# Patient Record
Sex: FEMALE | Race: WHITE | NOT HISPANIC OR LATINO | Employment: OTHER | URBAN - METROPOLITAN AREA
[De-identification: names, ages, dates, MRNs, and addresses within clinical notes are randomized per-mention and may not be internally consistent; named-entity substitution may affect disease eponyms.]

---

## 2017-01-04 ENCOUNTER — LAB VISIT (OUTPATIENT)
Dept: LAB | Facility: HOSPITAL | Age: 42
End: 2017-01-04
Attending: FAMILY MEDICINE
Payer: OTHER GOVERNMENT

## 2017-01-04 DIAGNOSIS — Z00.00 ROUTINE CHECK-UP: ICD-10-CM

## 2017-01-04 DIAGNOSIS — Z13.220 SCREENING CHOLESTEROL LEVEL: ICD-10-CM

## 2017-01-04 LAB
25(OH)D3+25(OH)D2 SERPL-MCNC: 64 NG/ML
ALBUMIN SERPL BCP-MCNC: 3.9 G/DL
ALP SERPL-CCNC: 69 U/L
ALT SERPL W/O P-5'-P-CCNC: 13 U/L
ANION GAP SERPL CALC-SCNC: 9 MMOL/L
AST SERPL-CCNC: 16 U/L
BASOPHILS # BLD AUTO: 0.04 K/UL
BASOPHILS NFR BLD: 0.5 %
BILIRUB SERPL-MCNC: 0.6 MG/DL
BUN SERPL-MCNC: 14 MG/DL
CALCIUM SERPL-MCNC: 9.3 MG/DL
CHLORIDE SERPL-SCNC: 105 MMOL/L
CHOLEST/HDLC SERPL: 2.9 {RATIO}
CO2 SERPL-SCNC: 24 MMOL/L
CREAT SERPL-MCNC: 0.8 MG/DL
DIFFERENTIAL METHOD: NORMAL
EOSINOPHIL # BLD AUTO: 0.2 K/UL
EOSINOPHIL NFR BLD: 2.7 %
ERYTHROCYTE [DISTWIDTH] IN BLOOD BY AUTOMATED COUNT: 13.2 %
EST. GFR  (AFRICAN AMERICAN): >60 ML/MIN/1.73 M^2
EST. GFR  (NON AFRICAN AMERICAN): >60 ML/MIN/1.73 M^2
GLUCOSE SERPL-MCNC: 87 MG/DL
HCT VFR BLD AUTO: 42.2 %
HDL/CHOLESTEROL RATIO: 34.4 %
HDLC SERPL-MCNC: 180 MG/DL
HDLC SERPL-MCNC: 62 MG/DL
HGB BLD-MCNC: 14.4 G/DL
LDLC SERPL CALC-MCNC: 103 MG/DL
LYMPHOCYTES # BLD AUTO: 2.1 K/UL
LYMPHOCYTES NFR BLD: 24.1 %
MCH RBC QN AUTO: 29.2 PG
MCHC RBC AUTO-ENTMCNC: 34.1 %
MCV RBC AUTO: 86 FL
MONOCYTES # BLD AUTO: 0.6 K/UL
MONOCYTES NFR BLD: 6.3 %
NEUTROPHILS # BLD AUTO: 5.9 K/UL
NEUTROPHILS NFR BLD: 66.2 %
NONHDLC SERPL-MCNC: 118 MG/DL
PLATELET # BLD AUTO: 245 K/UL
PMV BLD AUTO: 10.7 FL
POTASSIUM SERPL-SCNC: 3.9 MMOL/L
PROT SERPL-MCNC: 7.9 G/DL
RBC # BLD AUTO: 4.93 M/UL
SODIUM SERPL-SCNC: 138 MMOL/L
TRIGL SERPL-MCNC: 75 MG/DL
TSH SERPL DL<=0.005 MIU/L-ACNC: 1.7 UIU/ML
WBC # BLD AUTO: 8.84 K/UL

## 2017-01-04 PROCEDURE — 82306 VITAMIN D 25 HYDROXY: CPT

## 2017-01-04 PROCEDURE — 84443 ASSAY THYROID STIM HORMONE: CPT

## 2017-01-04 PROCEDURE — 80053 COMPREHEN METABOLIC PANEL: CPT

## 2017-01-04 PROCEDURE — 36415 COLL VENOUS BLD VENIPUNCTURE: CPT | Mod: PO

## 2017-01-04 PROCEDURE — 80061 LIPID PANEL: CPT

## 2017-01-04 PROCEDURE — 85025 COMPLETE CBC W/AUTO DIFF WBC: CPT

## 2017-02-01 ENCOUNTER — DOCUMENTATION ONLY (OUTPATIENT)
Dept: FAMILY MEDICINE | Facility: CLINIC | Age: 42
End: 2017-02-01

## 2017-02-01 NOTE — PROGRESS NOTES
Pre-Visit Chart Review  For Appointment Scheduled on 2/6/17.    Health Maintenance Due   Topic Date Due    TETANUS VACCINE  12/11/1991    Pap Smear  12/11/1994    Mammogram  12/11/2013

## 2017-02-23 ENCOUNTER — APPOINTMENT (OUTPATIENT)
Dept: LAB | Facility: HOSPITAL | Age: 42
End: 2017-02-23
Attending: NURSE PRACTITIONER
Payer: OTHER GOVERNMENT

## 2017-02-23 ENCOUNTER — OFFICE VISIT (OUTPATIENT)
Dept: OBSTETRICS AND GYNECOLOGY | Facility: CLINIC | Age: 42
End: 2017-02-23
Payer: OTHER GOVERNMENT

## 2017-02-23 VITALS
WEIGHT: 144.63 LBS | DIASTOLIC BLOOD PRESSURE: 73 MMHG | HEIGHT: 67 IN | SYSTOLIC BLOOD PRESSURE: 111 MMHG | HEART RATE: 71 BPM | BODY MASS INDEX: 22.7 KG/M2

## 2017-02-23 DIAGNOSIS — Z01.419 ENCOUNTER FOR WELL WOMAN EXAM: Primary | ICD-10-CM

## 2017-02-23 PROCEDURE — 99213 OFFICE O/P EST LOW 20 MIN: CPT | Mod: PBBFAC,PO | Performed by: NURSE PRACTITIONER

## 2017-02-23 PROCEDURE — 87624 HPV HI-RISK TYP POOLED RSLT: CPT

## 2017-02-23 PROCEDURE — 99999 PR PBB SHADOW E&M-EST. PATIENT-LVL III: CPT | Mod: PBBFAC,,, | Performed by: NURSE PRACTITIONER

## 2017-02-23 PROCEDURE — 88175 CYTOPATH C/V AUTO FLUID REDO: CPT | Performed by: PATHOLOGY

## 2017-02-23 PROCEDURE — 99386 PREV VISIT NEW AGE 40-64: CPT | Mod: S$PBB,,, | Performed by: NURSE PRACTITIONER

## 2017-02-23 PROCEDURE — 88141 CYTOPATH C/V INTERPRET: CPT | Mod: ,,, | Performed by: PATHOLOGY

## 2017-02-23 NOTE — LETTER
February 23, 2017      Maggie Hay MD  [address]  [place] [address]           [facility] - OB/ GYN  [address] [place] [address] [address]  [facility] [address]  Phone: [phone]          Patient: Nancy Hanley   MR Number: [id number]   YOB: 1975   Date of Visit: 2/23/2017       Dear Dr. Maggie Hay:    Thank you for referring Nancy Hanley to me for evaluation. Attached you will find relevant portions of my assessment and plan of care.    If you have questions, please do not hesitate to call me. I look forward to following Nancy Hanley along with you.    Sincerely,    Ana Riojas, NP    Enclosure  CC:  No Recipients    If you would like to receive this communication electronically, please contact [email or web address] or [phone] to request more information on [facility] Link access.    For providers and/or their staff who would like to refer a patient to [facility], please contact us through our one-stop-shop provider referral line, [facility] , at [phone].    If you feel you have received this communication in error or would no longer like to receive these types of communications, please e-mail [email or web address]

## 2017-02-23 NOTE — PROGRESS NOTES
Chief Complaint   Patient presents with    Well Woman       History of Present Illness: Nancy Hanley is a 41 y.o. female that presents today 2017 for well gyn visit.  Pt here for annual exam. Pt denies ever having any abnormal pap smears in the past. Pt states that she had a pap smear 3 years ago with the [employer]. Her cycles are regular monthly with no associated complaints. She does not desire to be on birth control. Pt has her first mammogram scheduled in May. No other complaints or concerns noted.         Past Medical History   Diagnosis Date    Allergy     Asthma     Psoriasis        Past Surgical History   Procedure Laterality Date    Nasal polyp surgery  1990s    Breast surgery       augmentin       Family History   Problem Relation Age of Onset    Stroke Mother     Hypertension Mother     Deep vein thrombosis Mother     Asthma Maternal Uncle     Pancreatitis Maternal Uncle     Lung cancer Maternal Grandmother     Psoriasis Maternal Grandmother     Emphysema Maternal Grandfather        Social History     Social History    Marital status:      Spouse name: N/A    Number of children: N/A    Years of education: N/A     Occupational History    self-employeed      Social History Main Topics    Smoking status: Never Smoker    Smokeless tobacco: Never Used    Alcohol use Yes      Comment: occasionally    Drug use: No    Sexual activity: Yes     Partners: Male     Birth control/ protection: Partner-Vasectomy     Other Topics Concern    None     Social History Narrative       OB History    Para Term  AB SAB TAB Ectopic Multiple Living   3 0 0 0 0 0 0 0 0 3      # Outcome Date GA Lbr Trent/2nd Weight Sex Delivery Anes PTL Lv   3       Vag-Spont   Y   2       Vag-Spont   Y   1       Vag-Spont   Y          Current Outpatient Prescriptions   Medication Sig Dispense Refill    albuterol 90 mcg/actuation inhaler Inhale 2 puffs into the lungs every 6  "(six) hours as needed for Wheezing. 18 g 5    cetirizine (ZYRTEC) 10 MG tablet Take 1 tablet (10 mg total) by mouth once daily. 90 tablet 3    fluticasone (FLONASE) 50 mcg/actuation nasal spray 1 spray by Each Nare route once daily. 16 g 5    montelukast (SINGULAIR) 10 mg tablet Take 1 tablet (10 mg total) by mouth every evening. 90 tablet 3     No current facility-administered medications for this visit.        Review of patient's allergies indicates:   Allergen Reactions    Codeine Nausea And Vomiting           Percocet [oxycodone-acetaminophen] Other (See Comments)     Vomit, pass out, elevates blood pressure       Review of Symptoms:  GENERAL: Denies weight gain or weight loss. Feeling well overall.   SKIN: Denies rash or lesions.   ABDOMEN: No abdominal pain, constipation, diarrhea, nausea, vomiting or rectal bleeding.   URINARY: No frequency, dysuria, hematuria, or burning on urination.      Visit Vitals    /73    Pulse 71    Ht 5' 7" (1.702 m)    Wt 65.6 kg (144 lb 10 oz)    LMP 02/02/2017 (Exact Date)       Physical Exam:  APPEARANCE: Well nourished, well developed, in no acute distress.  SKIN: Normal skin turgor, no lesions.  RESPIRATORY: Normal respiratory effort with no retractions or use of accessory muscles  ABDOMEN: Soft. No tenderness or masses. No hepatosplenomegaly. No hernias.  BREASTS: Symmetrical, no skin changes or visible lesions. No palpable masses, nipple discharge or adenopathy bilaterally.  PELVIC: Normal external female genitalia without lesions. Normal hair distribution. Adequate perineal body, normal urethral meatus. Urethra with no masses.  Bladder nontender. Vagina moist and well rugated without lesions or discharge. Cervix pink and without lesions. No significant cystocele or rectocele. Bimanual exam showed uterus normal size, shape, position, mobile and nontender. Adnexa without masses or tenderness. Urethra and bladder normal. PAP DONE    ASSESSMENT/PLAN:  Encounter " for well woman exam  -     Liquid-based pap smear, screening          Patient was counseled today on Pap guidelines, recommendation for pelvic exams, mammograms starting annually at age 40, Colonoscopy after the age of 50, Dexa Bone Scan and calcium and vitamin D supplementation in menopause and to see her PCP for other health maintenance.        Follow-up:  RTC in 1 year for annual exam or as needed

## 2017-03-10 ENCOUNTER — TELEPHONE (OUTPATIENT)
Dept: OBSTETRICS AND GYNECOLOGY | Facility: CLINIC | Age: 42
End: 2017-03-10

## 2017-03-10 LAB — HUMAN PAPILLOMAVIRUS (HPV): NOT DETECTED

## 2017-03-10 NOTE — TELEPHONE ENCOUNTER
Please call pt and inform her that her pap smear came back slightly abnormal and was graded as ASC-US (atypical squamous cell of undetermined significance). An automatic HPV testing is done when this is the result and it was negative for HPV. According to these results and her age it recommended that she repeat the pap smear in 1 year or can repeat pap with the HPV testing in 3 years.    Thanks!

## 2017-03-13 NOTE — TELEPHONE ENCOUNTER
----- Message from Izabella Golden sent at 3/13/2017  8:57 AM CDT -----  Contact: pt [phone]  Patient called and states she just missed your call for her results.

## 2017-04-17 ENCOUNTER — DOCUMENTATION ONLY (OUTPATIENT)
Dept: FAMILY MEDICINE | Facility: CLINIC | Age: 42
End: 2017-04-17

## 2017-04-17 NOTE — PROGRESS NOTES
Pre-Visit Chart Review  For Appointment Scheduled on 4/19/17.    Health Maintenance Due   Topic Date Due    TETANUS VACCINE  12/11/1991    Mammogram  12/11/2013    Influenza Vaccine  08/01/2016

## 2017-04-19 ENCOUNTER — HOSPITAL ENCOUNTER (OUTPATIENT)
Dept: RADIOLOGY | Facility: CLINIC | Age: 42
Discharge: HOME | End: 2017-04-19
Attending: FAMILY MEDICINE
Payer: OTHER GOVERNMENT

## 2017-04-19 ENCOUNTER — LAB VISIT (OUTPATIENT)
Dept: LAB | Facility: HOSPITAL | Age: 42
End: 2017-04-19
Attending: FAMILY MEDICINE
Payer: OTHER GOVERNMENT

## 2017-04-19 ENCOUNTER — OFFICE VISIT (OUTPATIENT)
Dept: FAMILY MEDICINE | Facility: CLINIC | Age: 42
End: 2017-04-19
Payer: OTHER GOVERNMENT

## 2017-04-19 VITALS
HEART RATE: 80 BPM | WEIGHT: 144.81 LBS | DIASTOLIC BLOOD PRESSURE: 81 MMHG | HEIGHT: 65 IN | SYSTOLIC BLOOD PRESSURE: 116 MMHG | TEMPERATURE: 99 F | BODY MASS INDEX: 24.12 KG/M2

## 2017-04-19 DIAGNOSIS — N60.19 FIBROCYSTIC BREAST DISEASE, UNSPECIFIED LATERALITY: ICD-10-CM

## 2017-04-19 DIAGNOSIS — M79.671 RIGHT FOOT PAIN: ICD-10-CM

## 2017-04-19 DIAGNOSIS — R22.1 NECK FULLNESS: ICD-10-CM

## 2017-04-19 DIAGNOSIS — H43.399 FLOATERS, UNSPECIFIED LATERALITY: Primary | ICD-10-CM

## 2017-04-19 PROBLEM — J30.2 SEASONAL ALLERGIES: Status: ACTIVE | Noted: 2017-04-19

## 2017-04-19 PROCEDURE — 36415 COLL VENOUS BLD VENIPUNCTURE: CPT | Mod: PO

## 2017-04-19 PROCEDURE — 73630 X-RAY EXAM OF FOOT: CPT | Mod: 26,RT,S$GLB, | Performed by: RADIOLOGY

## 2017-04-19 PROCEDURE — 99214 OFFICE O/P EST MOD 30 MIN: CPT | Mod: S$PBB,,, | Performed by: FAMILY MEDICINE

## 2017-04-19 PROCEDURE — 83018 HEAVY METAL QUAN EACH NES: CPT

## 2017-04-19 PROCEDURE — 73630 X-RAY EXAM OF FOOT: CPT | Mod: TC,PO,RT

## 2017-04-19 PROCEDURE — 99214 OFFICE O/P EST MOD 30 MIN: CPT | Mod: PBBFAC,PO | Performed by: FAMILY MEDICINE

## 2017-04-19 PROCEDURE — 99999 PR PBB SHADOW E&M-EST. PATIENT-LVL IV: CPT | Mod: PBBFAC,,, | Performed by: FAMILY MEDICINE

## 2017-04-19 NOTE — PROGRESS NOTES
CHIEF COMPLAINT:  Establish care    HISTORY OF PRESENT ILLNESS:  Nancy Hanley is a 41 y.o. female who presents to clinic as a new patient to me to establish care. This is not a routine medical exam as she had this in December 2016.  She has been noticing floaters in her vision over the last year.  She denies any other vision changes. She has fibrocystic breast disease and requests that her iodine level be checked before beginning iodine supplementation, she does not use iodized salt.  She has had right plantar fasciitis, there has been some improvement with a heel cup, she does not use any antiinflammatories. She describes several year history of left neck mass. It is not tender, painful and has not increased in size.       REVIEW OF SYSTEMS:  The patient denies any fever, chills, night sweats, headaches, vision changes, difficulty speaking or swallowing, decreased hearing, weight loss, weight gain, chest pain, palpitations, shortness of breath, cough, nausea, vomiting, abdominal pain, dysuria, diarrhea, constipation, hematuria, hematochezia, melena, changes in her hair, skin, nails, numbness or weakness in her extremities, erythema, swelling over any of her joints, myalgia, swollen glands, easy bruising, fatigue, edema      MEDICATIONS:   Reviewed and/or reconciled in EPIC    ALLERGIES:  Reviewed and/or reconciled in [facility]    PAST MEDICAL/SURGICAL HISTORY:   Past Medical History:   Diagnosis Date    Allergy     Asthma     Psoriasis       Past Surgical History:   Procedure Laterality Date    BREAST SURGERY      augmentin    NASAL POLYP SURGERY  1990s       FAMILY HISTORY:    Family History   Problem Relation Age of Onset    Stroke Mother     Hypertension Mother     Deep vein thrombosis Mother     Asthma Maternal Uncle     Pancreatitis Maternal Uncle     Lung cancer Maternal Grandmother     Psoriasis Maternal Grandmother     Emphysema Maternal Grandfather        SOCIAL HISTORY:    Social History  "    Social History    Marital status:      Spouse name: N/A    Number of children: N/A    Years of education: N/A     Occupational History    self-employeed      Social History Main Topics    Smoking status: Never Smoker    Smokeless tobacco: Never Used    Alcohol use Yes      Comment: occasionally    Drug use: No    Sexual activity: Yes     Partners: Male     Birth control/ protection: Partner-Vasectomy     Other Topics Concern    Not on file     Social History Narrative       PHYSICAL EXAM:  VITAL SIGNS:   Vitals:    04/19/17 0909   BP: 116/81   BP Location: Right arm   Patient Position: Sitting   BP Method: Automatic   Pulse: 80   Temp: 98.6 °F (37 °C)   TempSrc: Oral   Weight: 65.7 kg (144 lb 13.5 oz)   Height: 5' 5" (1.651 m)     GENERAL:  Patient appears well nourished, sitting on exam table, in no acute distress.  HEENT:  Atraumatic, normocephalic, PERRLA, EOMI, no conjunctival injection, sclerae are anicteric, normal external auditory canals,TMs clear b/l, gross hearing intact to whisper, MMM, no oropharygneal erythema or exudate.  NECK:  Supple, normal ROM, trachea is midline , no supraclavicular or cervical LAD or masses palpated.  Thyroid gland not palpable.  CARDIOVASCULAR:  RRR, normal S1 and S2, no m/r/g.  RESPIRATORY:  CTA b/l, no wheezes, rhonchi, rales.  No increased work of breathing, no  use of accessory muscles.  ABDOMEN:  Soft, nontender, nondistended, normoactive bowel sounds in all four quadrants, no rebound or guarding, no HSM or masses palpated.  Normal percussion.  EXTREMITIES:  2+ DP pulses b/l, no edema.  SKIN:  Warm, no lesions on exposed skin.  NEUROMUSCULAR:  Cranial nerves II-XII grossly intact. There is tenderness over left calcaneous. No clubbing or cyanosis of digits/nails.  Steady gait.  PSYCH:  Patient is alert and oriented to person, time, place. They are appropriately dressed and groomed. There is normal eye contact. Rate and tone of speech is normal. Normal " insight, judgement. Normal thought content and process.     LABORATORY/IMAGING STUDIES: pending    ASSESSMENT/PLAN: This is a 41 y.o. female who presents to clinic for evaluation of the following concerns  1. Floaters: refer to optometry  2. Fibrocystic breast disease: obtain iodine level, mammogram scheduled for May   3. Right foot pain: xray imaging, refer to podiatry.  4. Neck mass: she is likely feeling the hyoid or thyroid cartilage on the left.  Will obtain ultrasound imaging.           Maggie Hay MD

## 2017-04-19 NOTE — MR AVS SNAPSHOT
[facility]  [address]  [place] [address]  Phone: [phone]  Fax: [phone]                  Nancy Hanley   2017 9:00 AM   Office Visit    Description:  Female : 1975   Provider:  Maggie Hay MD   Department:  [place] - Family Medicine           Reason for Visit     Establish Care           Diagnoses this Visit        Comments    Floaters, unspecified laterality    -  Primary     Neck fullness         Fibrocystic breast disease, unspecified laterality         Right foot pain                To Do List           Future Appointments        Provider Department Dept Phone    2017 11:45 AM LAB, [facility] [place] [facility] - Lab [phone]    2017 12:00 PM [facility] XR1 [place] [facility]- X-Ray [phone]    2017 7:30 AM [facility] US1 [place] [facility]- Ultrasound [phone]    5/3/2017 9:15 AM Ramiro Holland DPM [place] - Podiatry [phone]    2017 9:30 AM [facility] MAMMO1 [place] [facility]- Mammography [phone]      Goals (5 Years of Data)     None      Follow-Up and Disposition     Return in about 1 year (around 2018) for routine medical exam.      [facility]     [facility] Nurse Care Line -  Assistance  Unless otherwise directed by your provider, please contact [facility], our nurse care line that is available for  assistance.     Registered nurses in the [facility] provide: appointment scheduling, clinical advisement, health education, and other advisory services.  Call: [phone] (toll free)               Medications           Message regarding Medications     Verify the changes and/or additions to your medication regime listed below are the same as discussed with your clinician today.  If any of these changes or additions are incorrect, please notify your healthcare provider.             Verify that the below list of medications is an accurate representation of the medications you are currently  "taking.  If none reported, the list may be blank. If incorrect, please contact your healthcare provider. Carry this list with you in case of emergency.           Current Medications     albuterol 90 mcg/actuation inhaler Inhale 2 puffs into the lungs every 6 (six) hours as needed for Wheezing.    cetirizine (ZYRTEC) 10 MG tablet Take 1 tablet (10 mg total) by mouth once daily.    fluticasone (FLONASE) 50 mcg/actuation nasal spray 1 spray by Each Nare route once daily.    montelukast (SINGULAIR) 10 mg tablet Take 1 tablet (10 mg total) by mouth every evening.           Clinical Reference Information           Your Vitals Were     BP Pulse Temp Height Weight Last Period    116/81 (BP Location: Right arm, Patient Position: Sitting, BP Method: Automatic) 80 98.6 °F (37 °C) (Oral) 5' 5" (1.651 m) 65.7 kg (144 lb 13.5 oz) 04/03/2017    BMI                24.1 kg/m2          Blood Pressure          Most Recent Value    BP  116/81      Allergies as of 4/19/2017     Codeine    Percocet [Oxycodone-acetaminophen]      Immunizations Administered on Date of Encounter - 4/19/2017     None      Orders Placed During Today's Visit      Normal Orders This Visit    Ambulatory referral to Optometry     Ambulatory referral to Podiatry     Future Labs/Procedures Expected by Expires    IODINE, SERUM  4/19/2017 6/18/2018     Soft Tissue Head Neck Thyroid  4/19/2017 4/20/2018    X-Ray Foot Complete Right  4/19/2017 4/19/2018      Language Assistance Services     ATTENTION: Language assistance services are available, free of charge. Please call [phone].      ATENCIÓN: Si habla español, tiene a mojica disposición servicios gratuitos de asistencia lingüística. Llame al [phone].     CHÚ Ý: N?u b?n nói Ti?ng Vi?t, có các d?ch v? h? tr? ngôn ng? mi?n phí dành cho b?n. G?i s? [phone].         [facility] complies with applicable Federal civil rights laws and does not discriminate on the basis of race, color, " national origin, age, disability, or sex.

## 2017-04-20 LAB — IODINE SERPL-MCNC: 68 NG/ML (ref 40–92)

## 2017-04-27 ENCOUNTER — HOSPITAL ENCOUNTER (OUTPATIENT)
Dept: RADIOLOGY | Facility: CLINIC | Age: 42
Discharge: HOME | End: 2017-04-27
Attending: FAMILY MEDICINE
Payer: OTHER GOVERNMENT

## 2017-04-27 DIAGNOSIS — R22.1 NECK FULLNESS: ICD-10-CM

## 2017-04-27 PROCEDURE — 76536 US EXAM OF HEAD AND NECK: CPT | Mod: TC,PO

## 2017-04-27 PROCEDURE — 76536 US EXAM OF HEAD AND NECK: CPT | Mod: 26,,, | Performed by: RADIOLOGY

## 2017-05-03 ENCOUNTER — OFFICE VISIT (OUTPATIENT)
Dept: PODIATRY | Facility: CLINIC | Age: 42
End: 2017-05-03
Payer: OTHER GOVERNMENT

## 2017-05-03 VITALS — BODY MASS INDEX: 24.06 KG/M2 | HEIGHT: 65 IN | WEIGHT: 144.38 LBS

## 2017-05-03 DIAGNOSIS — M21.6X9 ACQUIRED EQUINUS DEFORMITY OF FOOT, UNSPECIFIED LATERALITY: ICD-10-CM

## 2017-05-03 DIAGNOSIS — M79.671 FOOT PAIN, RIGHT: ICD-10-CM

## 2017-05-03 DIAGNOSIS — M72.2 PLANTAR FASCIITIS: Primary | ICD-10-CM

## 2017-05-03 PROCEDURE — 99999 PR PBB SHADOW E&M-EST. PATIENT-LVL II: CPT | Mod: PBBFAC,,, | Performed by: PODIATRIST

## 2017-05-03 PROCEDURE — 99212 OFFICE O/P EST SF 10 MIN: CPT | Mod: PBBFAC,25,PO | Performed by: PODIATRIST

## 2017-05-03 PROCEDURE — 29540 STRAPPING ANKLE &/FOOT: CPT | Mod: PBBFAC,PO,RT | Performed by: PODIATRIST

## 2017-05-03 PROCEDURE — 99203 OFFICE O/P NEW LOW 30 MIN: CPT | Mod: 25,S$PBB,, | Performed by: PODIATRIST

## 2017-05-03 PROCEDURE — 29540 STRAPPING ANKLE &/FOOT: CPT | Mod: S$PBB,RT,, | Performed by: PODIATRIST

## 2017-05-03 RX ORDER — MELOXICAM 15 MG/1
TABLET ORAL
Qty: 90 TABLET | Refills: 0 | Status: SHIPPED | OUTPATIENT
Start: 2017-05-03 | End: 2018-05-28

## 2017-05-03 RX ORDER — MELOXICAM 15 MG/1
15 TABLET ORAL DAILY
Qty: 30 TABLET | Refills: 0 | Status: SHIPPED | OUTPATIENT
Start: 2017-05-03 | End: 2017-05-03 | Stop reason: SDUPTHER

## 2017-05-03 NOTE — LETTER
May 3, 2017      Maggie Hay MD  [address]  [place] [address]           [place] [facility]  [address] [address]  Phone: [phone]          Patient: Nancy Hanley   MR Number: [id number]   YOB: 1975   Date of Visit: 5/3/2017       Dear Dr. Maggie Hay:    Thank you for referring Nancy Hanley to me for evaluation. Attached you will find relevant portions of my assessment and plan of care.    If you have questions, please do not hesitate to call me. I look forward to following Nancy Hanley along with you.    Sincerely,    Ramiro Holland, CASANDRA    Enclosure  CC:  No Recipients    If you would like to receive this communication electronically, please contact [email or web address] or [phone] to request more information on [facility] Link access.    For providers and/or their staff who would like to refer a patient to [facility], please contact us through our one-stop-shop provider referral line, [facility], at [phone].    If you feel you have received this communication in error or would no longer like to receive these types of communications, please e-mail [email or web address]

## 2017-05-03 NOTE — PROGRESS NOTES
Subjective:      Patient ID: Nancy Hanley is a 41 y.o. female.    Chief Complaint: Foot Pain (right)    Sharp deep pain bottom right heel.  Gradual onset, worsening over past several weeks, aggravated by increased weight bearing, shoe gear, pressure.  No previous medical treatment.  OTC pain med not helping.  Denies trauma.  xrays negative acute injury.    Review of Systems   Constitution: Negative for chills, diaphoresis, fever, malaise/fatigue and night sweats.   Cardiovascular: Negative for claudication, cyanosis, leg swelling and syncope.   Skin: Negative for color change, dry skin, nail changes, rash, suspicious lesions and unusual hair distribution.   Musculoskeletal: Negative for falls, joint pain, joint swelling, muscle cramps, muscle weakness and stiffness.   Gastrointestinal: Negative for constipation, diarrhea, nausea and vomiting.   Neurological: Negative for brief paralysis, disturbances in coordination, focal weakness, numbness, paresthesias, sensory change and tremors.           Objective:      Physical Exam   Constitutional: She appears well-developed and well-nourished. She is cooperative. No distress.   Cardiovascular:   Pulses:       Popliteal pulses are 2+ on the right side, and 2+ on the left side.        Dorsalis pedis pulses are 2+ on the right side, and 2+ on the left side.        Posterior tibial pulses are 2+ on the right side, and 2+ on the left side.   Capillary refill 3 seconds all toes/distal feet, all toes/both feet warm to touch.      Negative lymphadenopathy bilateral popliteal fossa and tarsal tunnel.      Negavie lower extremity edema bilateral.     Musculoskeletal:        Right ankle: Normal. She exhibits normal range of motion, no swelling, no ecchymosis, no deformity, no laceration and normal pulse. Achilles tendon normal. Achilles tendon exhibits no pain, no defect and normal Schaefer's test results.   Sharp deep pain to palpation inferior heel right at medial  calcaneal tubercle without ecchymosis, erythema, edema, or cardinal signs infection, and no signs of trauma.    Ankle dorsiflexion decreased at <10 degrees bilateral with moderate increase with knee flexion bilateral.    Otherwise, Normal angle, base, station of gait. All ten toes without clubbing, cyanosis, or signs of ischemia.  No pain to palpation bilateral lower extremities.  Range of motion, stability, muscle strength, and muscle tone normal bilateral feet and legs.     Lymphadenopathy: No inguinal adenopathy noted on the right or left side.   Negative lymphadenopathy bilateral popliteal fossa and tarsal tunnel.   Neurological: She is alert. She has normal strength. She displays no atrophy and no tremor. No sensory deficit. She exhibits normal muscle tone. She displays no seizure activity. Gait normal.   Reflex Scores:       Patellar reflexes are 2+ on the right side and 2+ on the left side.       Achilles reflexes are 2+ on the right side and 2+ on the left side.  Negative tinel sign to percussion sural, superficial peroneal, deep peroneal, saphenous, and posterior tibial nerves right and left ankles and feet.     Skin: Skin is warm, dry and intact. No abrasion, no bruising, no burn, no ecchymosis, no laceration, no lesion and no rash noted. She is not diaphoretic. No cyanosis or erythema. No pallor. Nails show no clubbing.   Skin is normal age and health appropriate color, turgor, texture, and temperature bilateral lower extremities without ulceration, hyperpigmentation, discoloration, masses nodules or cords palpated.  No ecchymosis, erythema, edema, or cardinal signs of infection bilateral lower extremities.                 Assessment:       Encounter Diagnoses   Name Primary?    Plantar fasciitis Yes    Foot pain, right     Acquired equinus deformity of foot, unspecified laterality          Plan:       Nancy was seen today for foot pain.    Diagnoses and all orders for this visit:    Plantar  fasciitis    Foot pain, right    Acquired equinus deformity of foot, unspecified laterality    Other orders  -     meloxicam (MOBIC) 15 MG tablet; Take 1 tablet (15 mg total) by mouth once daily.      I counseled the patient on her conditions, their implications and medical management.        Patient will stretch the tendo achilles complex three times daily as demonstrated in the office.  Literature was dispensed illustrating proper stretching technique.    I applied a plantar rest strapping to the patient's right foot to offload symptomatic area, support the arch, and relieve pain.    Patient will obtain over the counter arch supports and wear them in shoes whenever possible.  Athletic shoes intended for walking or running are usually best.    The patient was advised that NSAID-type medications have two very important potential side effects: gastrointestinal irritation including hemorrhage and renal injuries. She was asked to take the medication with food and to stop if she experiences any GI upset. I asked her to call for vomiting, abdominal pain or black/bloody stools. The patient expresses understanding of these issues and questions were answered.    Discussed conservative treatment with shoes of adequate dimensions, material, and style to alleviate symptoms and delay or prevent surgical intervention.    Rx meloxicam          Return in about 1 month (around 6/3/2017).

## 2017-05-03 NOTE — MR AVS SNAPSHOT
[facility]  [address]  [place] [address]  Phone: [phone]                  Nancy Hanley   5/3/2017 9:15 AM   Office Visit    Description:  Female : 1975   Provider:  Ramiro Holland DPM   Department:  [facility]           Reason for Visit     Foot Pain           Diagnoses this Visit        Comments    Plantar fasciitis    -  Primary     Foot pain, right         Acquired equinus deformity of foot, unspecified laterality                To Do List           Future Appointments        Provider Department Dept Phone    2017 9:30 AM [facility] [phone]    2017 10:00 AM Ramiro Holland DPM [facility] [phone]      Goals (5 Years of Data)     None      Follow-Up and Disposition     Return in about 1 month (around 6/3/2017).    Follow-up and Disposition History       These Medications        Disp Refills Start End    meloxicam (MOBIC) 15 MG tablet 30 tablet 0 5/3/2017     Take 1 tablet (15 mg total) by mouth once daily. - Oral    Pharmacy: [facility] [address] [facility], [place] - [address] AT [facility] Ph #: [phone]         [facility]     [facility] -  Assistance  Unless otherwise directed by your provider, please contact [facility], our nurse care line that is available for  assistance.     Registered nurses in the [facility] provide: appointment scheduling, clinical advisement, health education, and other advisory services.  Call: [phone] (toll free)               Medications           Message regarding Medications     Verify the changes and/or additions to your medication regime listed below are the same as discussed with your clinician today.  If any of these changes or additions are incorrect, please notify your healthcare provider.        START taking these NEW medications        Refills    meloxicam (MOBIC) 15 MG  "tablet 0    Sig: Take 1 tablet (15 mg total) by mouth once daily.    Class: Normal    Route: Oral           Verify that the below list of medications is an accurate representation of the medications you are currently taking.  If none reported, the list may be blank. If incorrect, please contact your healthcare provider. Carry this list with you in case of emergency.           Current Medications     albuterol 90 mcg/actuation inhaler Inhale 2 puffs into the lungs every 6 (six) hours as needed for Wheezing.    cetirizine (ZYRTEC) 10 MG tablet Take 1 tablet (10 mg total) by mouth once daily.    fluticasone (FLONASE) 50 mcg/actuation nasal spray 1 spray by Each Nare route once daily.    montelukast (SINGULAIR) 10 mg tablet Take 1 tablet (10 mg total) by mouth every evening.    meloxicam (MOBIC) 15 MG tablet Take 1 tablet (15 mg total) by mouth once daily.           Clinical Reference Information           Your Vitals Were     Height Weight Last Period BMI       5' 5" (1.651 m) 65.5 kg (144 lb 6.4 oz) 04/03/2017 24.03 kg/m2       Allergies as of 5/3/2017     Codeine    Percocet [Oxycodone-acetaminophen]      Immunizations Administered on Date of Encounter - 5/3/2017     None      Language Assistance Services     ATTENTION: Language assistance services are available, free of charge. Please call [phone].      ATENCIÓN: Si habla kiko, tiene a mojica disposición servicios gratuitos de asistencia lingüística. Llame al [phone].     CHÚ Ý: N?u b?n nói Ti?ng Vi?t, có các d?ch v? h? tr? ngôn ng? mi?n phí dành cho b?n. G?i s? [phone].         [facility] complies with applicable Federal civil rights laws and does not discriminate on the basis of race, color, national origin, age, disability, or sex.        "

## 2017-05-29 ENCOUNTER — TELEPHONE (OUTPATIENT)
Dept: FAMILY MEDICINE | Facility: CLINIC | Age: 42
End: 2017-05-29

## 2017-05-29 DIAGNOSIS — N60.19 FIBROCYSTIC BREAST DISEASE, UNSPECIFIED LATERALITY: Primary | ICD-10-CM

## 2017-05-29 NOTE — TELEPHONE ENCOUNTER
Her iodine level was normal, she can start iodine supplementation and have a repeat level in 1-2 months.     Her neck ultrasound demonstrated some small thyroid nodules, none of which need to be biopsied. Would repeat ultrasound and TSH in 6 months, and some lymph nodes. If she is still noticing neck fullness let me know and I would order CT scan of neck.

## 2017-05-31 ENCOUNTER — TELEPHONE (OUTPATIENT)
Dept: FAMILY MEDICINE | Facility: CLINIC | Age: 42
End: 2017-05-31

## 2017-05-31 NOTE — TELEPHONE ENCOUNTER
----- Message from Sariah Argueta sent at 5/31/2017  7:24 AM CDT -----  Contact: self  Patient [phone] is returning call to Nurse from yesterday/please call

## 2017-05-31 NOTE — TELEPHONE ENCOUNTER
Patient advised and verbalized understanding. A copy of this message has been sent to her [employer] account as well

## 2017-10-05 ENCOUNTER — PATIENT MESSAGE (OUTPATIENT)
Dept: FAMILY MEDICINE | Facility: CLINIC | Age: 42
End: 2017-10-05

## 2017-10-05 DIAGNOSIS — J45.20 MILD INTERMITTENT ASTHMA WITHOUT COMPLICATION: ICD-10-CM

## 2017-10-06 ENCOUNTER — TELEPHONE (OUTPATIENT)
Dept: FAMILY MEDICINE | Facility: CLINIC | Age: 42
End: 2017-10-06

## 2017-10-06 RX ORDER — CETIRIZINE HYDROCHLORIDE 10 MG/1
10 TABLET ORAL DAILY
Qty: 90 TABLET | Refills: 0 | Status: SHIPPED | OUTPATIENT
Start: 2017-10-06 | End: 2018-01-11 | Stop reason: SDUPTHER

## 2017-10-06 RX ORDER — MONTELUKAST SODIUM 10 MG/1
10 TABLET ORAL NIGHTLY
Qty: 90 TABLET | Refills: 0 | Status: SHIPPED | OUTPATIENT
Start: 2017-10-06 | End: 2018-01-11 | Stop reason: SDUPTHER

## 2017-10-06 NOTE — TELEPHONE ENCOUNTER
Patient notified medication was refilled today,notified patient with worsening symptoms to go to ER,patient states she feels fine now but did not want it to get to the point of difficulty breathing

## 2017-10-06 NOTE — TELEPHONE ENCOUNTER
----- Message from Candice Nunn sent at 10/6/2017  2:56 PM CDT -----  states that just did her last refill before leaving, pls send rx's to phar, starting to have breathing problems..[phone] (home)

## 2018-01-10 ENCOUNTER — PATIENT MESSAGE (OUTPATIENT)
Dept: FAMILY MEDICINE | Facility: CLINIC | Age: 43
End: 2018-01-10

## 2018-01-11 DIAGNOSIS — J45.20 MILD INTERMITTENT ASTHMA WITHOUT COMPLICATION: ICD-10-CM

## 2018-01-11 RX ORDER — CETIRIZINE HYDROCHLORIDE 10 MG/1
10 TABLET ORAL DAILY
Qty: 90 TABLET | Refills: 3 | Status: SHIPPED | OUTPATIENT
Start: 2018-01-11 | End: 2019-01-22 | Stop reason: SDUPTHER

## 2018-01-11 RX ORDER — MONTELUKAST SODIUM 10 MG/1
10 TABLET ORAL NIGHTLY
Qty: 90 TABLET | Refills: 3 | Status: SHIPPED | OUTPATIENT
Start: 2018-01-11 | End: 2019-01-22 | Stop reason: SDUPTHER

## 2018-05-25 ENCOUNTER — DOCUMENTATION ONLY (OUTPATIENT)
Dept: FAMILY MEDICINE | Facility: CLINIC | Age: 43
End: 2018-05-25

## 2018-05-25 NOTE — PROGRESS NOTES
Pre-Visit Chart Review  For Appointment Scheduled on 05/28/18    Health Maintenance Due   Topic Date Due    TETANUS VACCINE  12/11/1991    Mammogram  12/11/2013

## 2018-05-28 ENCOUNTER — OFFICE VISIT (OUTPATIENT)
Dept: FAMILY MEDICINE | Facility: CLINIC | Age: 43
End: 2018-05-28
Payer: OTHER GOVERNMENT

## 2018-05-28 VITALS
HEART RATE: 80 BPM | SYSTOLIC BLOOD PRESSURE: 116 MMHG | HEIGHT: 65 IN | DIASTOLIC BLOOD PRESSURE: 79 MMHG | TEMPERATURE: 98 F | WEIGHT: 151.88 LBS | BODY MASS INDEX: 25.3 KG/M2 | OXYGEN SATURATION: 97 %

## 2018-05-28 DIAGNOSIS — Z12.39 SCREENING FOR BREAST CANCER: ICD-10-CM

## 2018-05-28 DIAGNOSIS — R59.0 CERVICAL LYMPHADENOPATHY: ICD-10-CM

## 2018-05-28 DIAGNOSIS — Z13.220 SCREENING CHOLESTEROL LEVEL: ICD-10-CM

## 2018-05-28 DIAGNOSIS — R22.1 NECK FULLNESS: Primary | ICD-10-CM

## 2018-05-28 DIAGNOSIS — E04.2 MULTINODULAR THYROID: ICD-10-CM

## 2018-05-28 PROCEDURE — 99213 OFFICE O/P EST LOW 20 MIN: CPT | Mod: S$PBB,,, | Performed by: PHYSICIAN ASSISTANT

## 2018-05-28 PROCEDURE — 99214 OFFICE O/P EST MOD 30 MIN: CPT | Mod: PBBFAC,PO | Performed by: PHYSICIAN ASSISTANT

## 2018-05-28 PROCEDURE — 99999 PR PBB SHADOW E&M-EST. PATIENT-LVL IV: CPT | Mod: PBBFAC,,, | Performed by: PHYSICIAN ASSISTANT

## 2018-05-28 NOTE — PROGRESS NOTES
Subjective:       Patient ID: Nancy Hanley is a 42 y.o. female.    Chief Complaint: Follow-up    HPI   Patient is a 42 year old  female presenting to the clinic for follow-up neck fullness, multi-nodular thyroid, and cervical lymphadenopathy seen on u/s 4/2017. Dr. Hay recommended repeat u/s in 6 months, but patient did not get this done.   Review of Systems   Constitutional: Negative for activity change, appetite change, chills, diaphoresis, fatigue and fever.   HENT: Negative for congestion, postnasal drip and rhinorrhea.    Respiratory: Negative.  Negative for cough, shortness of breath and wheezing.    Cardiovascular: Negative.  Negative for chest pain.   Gastrointestinal: Negative for abdominal pain, blood in stool, constipation, diarrhea, nausea and vomiting.   Genitourinary: Negative for dysuria, frequency, hematuria and urgency.   Musculoskeletal: Negative.         Neck fullness   Skin: Negative.  Negative for color change and rash.   Neurological: Negative for dizziness and syncope.   Psychiatric/Behavioral: Negative for agitation, behavioral problems and confusion.       Objective:      Physical Exam   Constitutional: Vital signs are normal. She appears well-developed and well-nourished. No distress.   Neck: No spinous process tenderness and no muscular tenderness present. No neck rigidity. Normal range of motion present. No thyroid mass and no thyromegaly present.   Cardiovascular: Normal rate, regular rhythm, S1 normal, S2 normal and normal heart sounds.  Exam reveals no gallop.    No murmur heard.  Pulses:       Radial pulses are 2+ on the right side, and 2+ on the left side.   <2sec cap refill fingers bilat     Pulmonary/Chest: Effort normal and breath sounds normal. No respiratory distress. She has no wheezes. She has no rhonchi.   Lymphadenopathy:        Head (right side): Tonsillar adenopathy present. No submental, no submandibular, no preauricular, no posterior auricular and  no occipital adenopathy present.        Head (left side): Tonsillar adenopathy present. No submental, no submandibular, no preauricular, no posterior auricular and no occipital adenopathy present.   Skin: Skin is warm and dry. She is not diaphoretic.   Appropriate skin turgor   Psychiatric: She has a normal mood and affect. Her speech is normal and behavior is normal. Judgment and thought content normal. Cognition and memory are normal.       Assessment:       1. Neck fullness    2. Multinodular thyroid    3. Cervical lymphadenopathy    4. Screening for breast cancer    5. Screening cholesterol level        Plan:       Nancy was seen today for follow-up.    Diagnoses and all orders for this visit:    Neck fullness    Multinodular thyroid  -     CBC auto differential; Future  -     TSH; Future  -     US Soft Tissue Head Neck Thyroid; Future    Cervical lymphadenopathy  -     CBC auto differential; Future  -     US Soft Tissue Head Neck Thyroid; Future    Screening for breast cancer  -     Mammo Digital Screening Bilateral With CAD; Future    Screening cholesterol level  -     CBC auto differential; Future  -     Comprehensive metabolic panel; Future  -     Lipid panel; Future

## 2018-06-04 ENCOUNTER — HOSPITAL ENCOUNTER (OUTPATIENT)
Dept: RADIOLOGY | Facility: CLINIC | Age: 43
Discharge: HOME | End: 2018-06-04
Attending: PHYSICIAN ASSISTANT
Payer: OTHER GOVERNMENT

## 2018-06-04 DIAGNOSIS — E04.2 MULTINODULAR THYROID: ICD-10-CM

## 2018-06-04 DIAGNOSIS — R59.0 CERVICAL LYMPHADENOPATHY: ICD-10-CM

## 2018-06-04 PROCEDURE — 76536 US EXAM OF HEAD AND NECK: CPT | Mod: 26,,, | Performed by: RADIOLOGY

## 2018-06-04 PROCEDURE — 76536 US EXAM OF HEAD AND NECK: CPT | Mod: TC,PO

## 2019-01-22 DIAGNOSIS — J45.20 MILD INTERMITTENT ASTHMA WITHOUT COMPLICATION: ICD-10-CM

## 2019-01-23 RX ORDER — CETIRIZINE HYDROCHLORIDE 10 MG/1
TABLET ORAL
Qty: 90 TABLET | Refills: 0 | Status: SHIPPED | OUTPATIENT
Start: 2019-01-23

## 2019-01-23 RX ORDER — MONTELUKAST SODIUM 10 MG/1
TABLET ORAL
Qty: 90 TABLET | Refills: 0 | Status: SHIPPED | OUTPATIENT
Start: 2019-01-23